# Patient Record
Sex: MALE | Race: WHITE | NOT HISPANIC OR LATINO | URBAN - NONMETROPOLITAN AREA
[De-identification: names, ages, dates, MRNs, and addresses within clinical notes are randomized per-mention and may not be internally consistent; named-entity substitution may affect disease eponyms.]

---

## 2020-11-20 ENCOUNTER — IMPORTED ENCOUNTER (OUTPATIENT)
Dept: URBAN - NONMETROPOLITAN AREA CLINIC 1 | Facility: CLINIC | Age: 50
End: 2020-11-20

## 2020-11-20 PROBLEM — H52.4: Noted: 2020-11-20

## 2020-11-20 PROBLEM — H40.013: Noted: 2020-11-20

## 2020-11-20 PROCEDURE — 92015 DETERMINE REFRACTIVE STATE: CPT

## 2020-11-20 PROCEDURE — 92004 COMPRE OPH EXAM NEW PT 1/>: CPT

## 2020-11-20 NOTE — PATIENT DISCUSSION
Presbyopia OUDiscussed refractive status in detail with patient. New glasses Rx given today but ok to continue using OTC readers. Continue to monitor. Glaucoma Suspect OUDiscussed diagnosis in detail with patient. No family history of disease. Discussed the chronic progressive nature of this disease and various treatment options. IOP at 16 OU. Cup to disc noted at 0.65 OU. Continue to monitor. RTC in 6 months with VF 24-2 OCT and Pach; 's Notes: MR  11/20/20DFE 11/20/20OCTVFOPTOSGONIOPACH

## 2022-04-09 ASSESSMENT — KERATOMETRY
OS_AXISANGLE_DEGREES: 023
OD_K1POWER_DIOPTERS: 44.00
OD_AXISANGLE_DEGREES: 002
OS_K2POWER_DIOPTERS: 44.75
OD_K2POWER_DIOPTERS: 45.50
OS_K1POWER_DIOPTERS: 43.75

## 2022-04-09 ASSESSMENT — TONOMETRY
OD_IOP_MMHG: 16
OS_IOP_MMHG: 16

## 2022-04-09 ASSESSMENT — VISUAL ACUITY
OS_CC: 20/20
OD_CC: 20/20
